# Patient Record
Sex: FEMALE | Race: WHITE | NOT HISPANIC OR LATINO | URBAN - METROPOLITAN AREA
[De-identification: names, ages, dates, MRNs, and addresses within clinical notes are randomized per-mention and may not be internally consistent; named-entity substitution may affect disease eponyms.]

---

## 2022-01-24 ENCOUNTER — WEB ENCOUNTER (OUTPATIENT)
Dept: URBAN - METROPOLITAN AREA CLINIC 23 | Facility: CLINIC | Age: 22
End: 2022-01-24

## 2022-01-24 ENCOUNTER — OFFICE VISIT (OUTPATIENT)
Dept: URBAN - METROPOLITAN AREA CLINIC 23 | Facility: CLINIC | Age: 22
End: 2022-01-24
Payer: COMMERCIAL

## 2022-01-24 DIAGNOSIS — R63.4 WEIGHT LOSS: ICD-10-CM

## 2022-01-24 DIAGNOSIS — R11.2 NAUSEA AND VOMITING, UNSPECIFIED VOMITING TYPE: ICD-10-CM

## 2022-01-24 DIAGNOSIS — E44.1 PROTEIN-CALORIE MALNUTRITION, MILD: ICD-10-CM

## 2022-01-24 DIAGNOSIS — R10.13 EPIGASTRIC PAIN: ICD-10-CM

## 2022-01-24 PROBLEM — 238107002: Status: ACTIVE | Noted: 2022-01-24

## 2022-01-24 PROCEDURE — 99204 OFFICE O/P NEW MOD 45 MIN: CPT | Performed by: INTERNAL MEDICINE

## 2022-01-24 PROCEDURE — G8420 CALC BMI NORM PARAMETERS: HCPCS | Performed by: INTERNAL MEDICINE

## 2022-01-24 PROCEDURE — 3017F COLORECTAL CA SCREEN DOC REV: CPT | Performed by: INTERNAL MEDICINE

## 2022-01-24 PROCEDURE — G9622 NO UNHEAL ETOH USER: HCPCS | Performed by: INTERNAL MEDICINE

## 2022-01-24 PROCEDURE — G8427 DOCREV CUR MEDS BY ELIG CLIN: HCPCS | Performed by: INTERNAL MEDICINE

## 2022-01-24 PROCEDURE — G9903 PT SCRN TBCO ID AS NON USER: HCPCS | Performed by: INTERNAL MEDICINE

## 2022-01-24 PROCEDURE — 1036F TOBACCO NON-USER: CPT | Performed by: INTERNAL MEDICINE

## 2022-01-24 RX ORDER — PANTOPRAZOLE SODIUM 40 MG/1
1 TABLET TABLET, DELAYED RELEASE ORAL
Qty: 90 TABLET | Refills: 0 | OUTPATIENT
Start: 2022-01-24

## 2022-01-24 NOTE — PHYSICAL EXAM GASTROINTESTINAL
Abdomen- soft, mild-tender in the epigastric area, nondistended , no guarding or rigidity , no masses palpable , normal bowel sounds

## 2022-01-24 NOTE — HPI-TODAY'S VISIT:
22-year-old  female presents for 1 year history of nausea, vomiting, abdominal pain.  Abdominal pain mainly in upper, can be anywhere.  Noisy bowel sounds.  30 pounds weight loss.  Intermittent black-colored stools.  Denies NSAIDs use.  She smokes marijuana every day.  No family history of IBD, colon cancer, stomach cancer. She saw primary care doctors she does not know who that was but had blood test and stool samples done.  She was told to see GI doctor as soon as possible. Her bowel movement once daily or every other day, [place] Stool Scale 5-6. vomiting every day for 1 month then now once every other week.

## 2022-02-04 ENCOUNTER — TELEPHONE ENCOUNTER (OUTPATIENT)
Dept: URBAN - METROPOLITAN AREA CLINIC 77 | Facility: CLINIC | Age: 22
End: 2022-02-04

## 2022-02-04 ENCOUNTER — LAB OUTSIDE AN ENCOUNTER (OUTPATIENT)
Dept: URBAN - METROPOLITAN AREA CLINIC 77 | Facility: CLINIC | Age: 22
End: 2022-02-04

## 2022-02-04 LAB
A/G RATIO: 2.5
ALBUMIN: 5
ALKALINE PHOSPHATASE: 62
ALT (SGPT): 7
AST (SGOT): 11
BILIRUBIN, TOTAL: 0.3
BUN/CREATININE RATIO: 15
BUN: 11
C-REACTIVE PROTEIN, QUANT: <1
CALCIUM: 9.9
CARBON DIOXIDE, TOTAL: 23
CHLORIDE: 102
CREATININE: 0.74
EGFR IF AFRICN AM: 135
EGFR IF NONAFRICN AM: 117
GLOBULIN, TOTAL: 2
GLUCOSE: 91
H PYLORI BREATH TEST: NEGATIVE
HEMATOCRIT: 39.9
HEMOGLOBIN: 13.3
IMMUNOGLOBULIN A, QN, SERUM: 225
LIPASE: 20
MCH: 30.9
MCHC: 33.3
MCV: 93
NRBC: (no result)
PLATELETS: 236
POTASSIUM: 4.4
PROTEIN, TOTAL: 7
RBC: 4.3
RDW: 11.9
SEDIMENTATION RATE-WESTERGREN: 2
SODIUM: 139
T-TRANSGLUTAMINASE (TTG) IGA: <2
WBC: 6.6

## 2022-02-10 ENCOUNTER — TELEPHONE ENCOUNTER (OUTPATIENT)
Dept: URBAN - METROPOLITAN AREA CLINIC 77 | Facility: CLINIC | Age: 22
End: 2022-02-10

## 2022-02-22 ENCOUNTER — CLAIMS CREATED FROM THE CLAIM WINDOW (OUTPATIENT)
Dept: URBAN - METROPOLITAN AREA SURGERY CENTER 8 | Facility: SURGERY CENTER | Age: 22
End: 2022-02-22
Payer: COMMERCIAL

## 2022-02-22 ENCOUNTER — OFFICE VISIT (OUTPATIENT)
Dept: URBAN - METROPOLITAN AREA SURGERY CENTER 8 | Facility: SURGERY CENTER | Age: 22
End: 2022-02-22

## 2022-02-22 ENCOUNTER — LAB OUTSIDE AN ENCOUNTER (OUTPATIENT)
Dept: URBAN - METROPOLITAN AREA CLINIC 78 | Facility: CLINIC | Age: 22
End: 2022-02-22

## 2022-02-22 ENCOUNTER — TELEPHONE ENCOUNTER (OUTPATIENT)
Dept: URBAN - METROPOLITAN AREA CLINIC 78 | Facility: CLINIC | Age: 22
End: 2022-02-22

## 2022-02-22 DIAGNOSIS — K31.A15 GASTRIC INTESTINAL METAPLASIA WITHOUT DYSPLASIA, INVOLVING MULTIPLE SITES: ICD-10-CM

## 2022-02-22 DIAGNOSIS — K22.89 ESOPHAGEAL BLEEDING: ICD-10-CM

## 2022-02-22 DIAGNOSIS — R10.13 ABDOMINAL DISCOMFORT, EPIGASTRIC: ICD-10-CM

## 2022-02-22 DIAGNOSIS — K29.60 ADENOPAPILLOMATOSIS GASTRICA: ICD-10-CM

## 2022-02-22 PROCEDURE — G8907 PT DOC NO EVENTS ON DISCHARG: HCPCS | Performed by: INTERNAL MEDICINE

## 2022-02-22 PROCEDURE — 43239 EGD BIOPSY SINGLE/MULTIPLE: CPT | Performed by: INTERNAL MEDICINE

## 2022-02-22 RX ORDER — PANTOPRAZOLE SODIUM 40 MG/1
1 TABLET TABLET, DELAYED RELEASE ORAL
Qty: 90 TABLET | Refills: 0 | Status: ACTIVE | COMMUNITY
Start: 2022-01-24

## 2022-02-22 RX ORDER — PROMETHAZINE HYDROCHLORIDE 25 MG/1
1 TABLET AS NEEDED TABLET ORAL
Qty: 60 TABLET | OUTPATIENT
Start: 2022-02-22 | End: 2022-03-24

## 2022-02-23 ENCOUNTER — TELEPHONE ENCOUNTER (OUTPATIENT)
Dept: URBAN - METROPOLITAN AREA CLINIC 23 | Facility: CLINIC | Age: 22
End: 2022-02-23

## 2022-02-25 ENCOUNTER — TELEPHONE ENCOUNTER (OUTPATIENT)
Dept: URBAN - METROPOLITAN AREA CLINIC 23 | Facility: CLINIC | Age: 22
End: 2022-02-25

## 2022-03-03 ENCOUNTER — LAB OUTSIDE AN ENCOUNTER (OUTPATIENT)
Dept: URBAN - METROPOLITAN AREA CLINIC 23 | Facility: CLINIC | Age: 22
End: 2022-03-03

## 2022-03-16 ENCOUNTER — TELEPHONE ENCOUNTER (OUTPATIENT)
Dept: URBAN - METROPOLITAN AREA CLINIC 78 | Facility: CLINIC | Age: 22
End: 2022-03-16

## 2022-03-16 RX ORDER — MIRTAZAPINE 15 MG/1
1 TABLET AT BEDTIME TABLET, FILM COATED ORAL ONCE A DAY
Qty: 30 | Refills: 2 | OUTPATIENT

## 2022-03-18 ENCOUNTER — LAB OUTSIDE AN ENCOUNTER (OUTPATIENT)
Dept: URBAN - METROPOLITAN AREA CLINIC 78 | Facility: CLINIC | Age: 22
End: 2022-03-18

## 2022-03-22 ENCOUNTER — OFFICE VISIT (OUTPATIENT)
Dept: URBAN - METROPOLITAN AREA SURGERY CENTER 8 | Facility: SURGERY CENTER | Age: 22
End: 2022-03-22

## 2022-03-22 ENCOUNTER — TELEPHONE ENCOUNTER (OUTPATIENT)
Dept: URBAN - METROPOLITAN AREA CLINIC 77 | Facility: CLINIC | Age: 22
End: 2022-03-22

## 2022-04-28 ENCOUNTER — TELEPHONE ENCOUNTER (OUTPATIENT)
Dept: URBAN - METROPOLITAN AREA CLINIC 77 | Facility: CLINIC | Age: 22
End: 2022-04-28

## 2022-05-05 ENCOUNTER — ERX REFILL RESPONSE (OUTPATIENT)
Dept: URBAN - METROPOLITAN AREA CLINIC 78 | Facility: CLINIC | Age: 22
End: 2022-05-05

## 2022-05-05 RX ORDER — MIRTAZAPINE 15 MG/1
TAKE 1 TABLET BY MOUTH EVERY DAY AT BEDTIME FOR 30 DAYS TABLET ORAL
Qty: 90 TABLET | Refills: 1 | OUTPATIENT

## 2022-05-05 RX ORDER — MIRTAZAPINE 15 MG/1
1 TABLET AT BEDTIME TABLET, FILM COATED ORAL ONCE A DAY
Qty: 30 | Refills: 2 | OUTPATIENT

## 2022-05-11 ENCOUNTER — LAB OUTSIDE AN ENCOUNTER (OUTPATIENT)
Dept: URBAN - METROPOLITAN AREA CLINIC 78 | Facility: CLINIC | Age: 22
End: 2022-05-11

## 2022-05-11 ENCOUNTER — DASHBOARD ENCOUNTERS (OUTPATIENT)
Age: 22
End: 2022-05-11

## 2022-05-11 ENCOUNTER — OFFICE VISIT (OUTPATIENT)
Dept: URBAN - METROPOLITAN AREA CLINIC 78 | Facility: CLINIC | Age: 22
End: 2022-05-11
Payer: COMMERCIAL

## 2022-05-11 VITALS
SYSTOLIC BLOOD PRESSURE: 100 MMHG | HEART RATE: 90 BPM | BODY MASS INDEX: 15.99 KG/M2 | DIASTOLIC BLOOD PRESSURE: 72 MMHG | HEIGHT: 65 IN | TEMPERATURE: 97.9 F | WEIGHT: 96 LBS

## 2022-05-11 DIAGNOSIS — R19.7 DIARRHEA OF PRESUMED INFECTIOUS ORIGIN: ICD-10-CM

## 2022-05-11 DIAGNOSIS — R11.2 NAUSEA AND VOMITING, UNSPECIFIED VOMITING TYPE: ICD-10-CM

## 2022-05-11 DIAGNOSIS — R63.4 WEIGHT LOSS: ICD-10-CM

## 2022-05-11 DIAGNOSIS — R10.13 EPIGASTRIC PAIN: ICD-10-CM

## 2022-05-11 PROCEDURE — 99214 OFFICE O/P EST MOD 30 MIN: CPT | Performed by: INTERNAL MEDICINE

## 2022-05-11 RX ORDER — PROMETHAZINE HYDROCHLORIDE 25 MG/1
1 TABLET AS NEEDED TABLET ORAL
Refills: 0 | Status: ACTIVE | COMMUNITY

## 2022-05-11 RX ORDER — SODIUM PICOSULFATE, MAGNESIUM OXIDE, AND ANHYDROUS CITRIC ACID 10; 3.5; 12 MG/160ML; G/160ML; G/160ML
160ML LIQUID ORAL AS DIRECTED
Qty: 320 ML | Refills: 0 | OUTPATIENT
Start: 2022-05-11 | End: 2022-05-12

## 2022-05-11 RX ORDER — ARIPIPRAZOLE 10 MG/1
1 TABLET ON THE TONGUE AND ALLOW TO DISSOLVE TABLET ORAL ONCE A DAY
Status: ACTIVE | COMMUNITY

## 2022-05-11 RX ORDER — LAMOTRIGINE 100 MG/1
1 TABLET TABLET ORAL ONCE A DAY
Status: ACTIVE | COMMUNITY

## 2022-05-11 RX ORDER — DEXTROAMPHETAMINE SULFATE, DEXTROAMPHETAMINE SACCHARATE, AMPHETAMINE SULFATE AND AMPHETAMINE ASPARTATE 3.75; 3.75; 3.75; 3.75 MG/1; MG/1; MG/1; MG/1
1 CAPSULE IN THE MORNING CAPSULE, EXTENDED RELEASE ORAL ONCE A DAY
Refills: 0 | Status: ACTIVE | COMMUNITY

## 2022-05-11 NOTE — HPI-TODAY'S VISIT:
22-year-old female presents epigastric pain, nausea vomiting, loose stools. Vomiting has resolved but still has nausea and epigastric pain.  Watery stool a lot every day.  Weight loss of 4 pounds noted compared to last office visit.  So far work-up EGD, CT enterography, labs, ultrasound unremarkable.  CT enterography showed possible wall thickening in the distal colon.  She was not able to finish gastric emptying study due to vomiting.  She did not try Remeron because of history of side effect hallucination when she was a kid.  Currently she is closely following her psychiatrist for bipolar and anxiety.  Some changes in her medications going on.

## 2022-05-11 NOTE — PREVIOUS WORKUP REVIEWED
.ENDOSCOPIES-EGD 2/22/2022: Normal. Repeat EGD in 3 years.*Pathology: Gastric-mild chronic gastritis, negative for H. pylori. Positive intestinal metaplasia complete and incomplete. Negative for atrophy, dysplasia or malignancy. Duodenum-normal. Distal esophagus-mild reflux esophagitis. Proximal esophagus-normal.LABS-Labs 2/2/2022: ESR 2, lipase 20, CRP < 1, H. pylori breath test negative, glucose 91, BUN 11, creatinine 0.74, sodium 139, potassium 4.4, total protein 7.0, albumin 5.0, total bilirubin 0.3, alkaline phosphatase 62, AST 11, ALT 7. IgA TTG < 2, IgA 225. WBC 6.6, hemoglobin 13.3, platelet 236.IMAGES-CT enterography 3/11/2022: No evidence of active inflammation in the small bowel. Apparent mild wall thickening of the sigmoid colon and rectum. Possibly secondary to under distention. Small volume nonspecific pelvic fluid.-RUQ US 2/28/2022: Normal liver and gallbladder, common bile duct, pancreas. No gallstone.

## 2022-05-20 ENCOUNTER — CLAIMS CREATED FROM THE CLAIM WINDOW (OUTPATIENT)
Dept: URBAN - METROPOLITAN AREA SURGERY CENTER 15 | Facility: SURGERY CENTER | Age: 22
End: 2022-05-20

## 2022-05-20 ENCOUNTER — CLAIMS CREATED FROM THE CLAIM WINDOW (OUTPATIENT)
Dept: URBAN - METROPOLITAN AREA SURGERY CENTER 15 | Facility: SURGERY CENTER | Age: 22
End: 2022-05-20
Payer: COMMERCIAL

## 2022-05-20 ENCOUNTER — LAB OUTSIDE AN ENCOUNTER (OUTPATIENT)
Dept: URBAN - METROPOLITAN AREA CLINIC 78 | Facility: CLINIC | Age: 22
End: 2022-05-20

## 2022-05-20 ENCOUNTER — TELEPHONE ENCOUNTER (OUTPATIENT)
Dept: URBAN - METROPOLITAN AREA CLINIC 78 | Facility: CLINIC | Age: 22
End: 2022-05-20

## 2022-05-20 DIAGNOSIS — Z53.8 FAILED ATTEMPTED SURGICAL PROCEDURE: ICD-10-CM

## 2022-05-20 DIAGNOSIS — R93.3 ABN FINDINGS-GI TRACT: ICD-10-CM

## 2022-05-20 DIAGNOSIS — R19.7 ACUTE DIARRHEA: ICD-10-CM

## 2022-05-20 PROCEDURE — 45378 DIAGNOSTIC COLONOSCOPY: CPT | Performed by: INTERNAL MEDICINE

## 2022-05-20 PROCEDURE — G8907 PT DOC NO EVENTS ON DISCHARG: HCPCS | Performed by: INTERNAL MEDICINE

## 2022-05-20 RX ORDER — ARIPIPRAZOLE 10 MG/1
1 TABLET ON THE TONGUE AND ALLOW TO DISSOLVE TABLET ORAL ONCE A DAY
Status: ACTIVE | COMMUNITY

## 2022-05-20 RX ORDER — PROMETHAZINE HYDROCHLORIDE 25 MG/1
1 TABLET AS NEEDED TABLET ORAL
Refills: 0 | Status: ACTIVE | COMMUNITY

## 2022-05-20 RX ORDER — SODIUM SULFATE, MAGNESIUM SULFATE, AND POTASSIUM CHLORIDE 17.75; 2.7; 2.25 G/1; G/1; G/1
12 TABLETS TABLET ORAL
Qty: 24 TABLETS | Refills: 0 | OUTPATIENT
Start: 2022-05-23 | End: 2022-05-24

## 2022-05-20 RX ORDER — SODIUM, POTASSIUM,MAG SULFATES 17.5-3.13G
17.5-13.3-1.6 GM/177ML SOLUTION, RECONSTITUTED, ORAL ORAL AS DIRECTED
Qty: 354 MILLILITER | OUTPATIENT
Start: 2022-05-20 | End: 2022-05-21

## 2022-05-20 RX ORDER — DEXTROAMPHETAMINE SULFATE, DEXTROAMPHETAMINE SACCHARATE, AMPHETAMINE SULFATE AND AMPHETAMINE ASPARTATE 3.75; 3.75; 3.75; 3.75 MG/1; MG/1; MG/1; MG/1
1 CAPSULE IN THE MORNING CAPSULE, EXTENDED RELEASE ORAL ONCE A DAY
Refills: 0 | Status: ACTIVE | COMMUNITY

## 2022-05-20 RX ORDER — LAMOTRIGINE 100 MG/1
1 TABLET TABLET ORAL ONCE A DAY
Status: ACTIVE | COMMUNITY

## 2022-05-31 ENCOUNTER — TELEPHONE ENCOUNTER (OUTPATIENT)
Dept: URBAN - METROPOLITAN AREA CLINIC 78 | Facility: CLINIC | Age: 22
End: 2022-05-31

## 2022-05-31 RX ORDER — SODIUM SULFATE, MAGNESIUM SULFATE, AND POTASSIUM CHLORIDE 17.75; 2.7; 2.25 G/1; G/1; G/1
12 TABLETS TABLET ORAL
Qty: 24 TABLETS | Refills: 0 | OUTPATIENT
Start: 2022-06-01 | End: 2022-06-02

## 2022-06-02 ENCOUNTER — OFFICE VISIT (OUTPATIENT)
Dept: URBAN - METROPOLITAN AREA SURGERY CENTER 15 | Facility: SURGERY CENTER | Age: 22
End: 2022-06-02
Payer: COMMERCIAL

## 2022-06-02 DIAGNOSIS — R93.3 ABN FINDINGS-GI TRACT: ICD-10-CM

## 2022-06-02 PROCEDURE — 45378 DIAGNOSTIC COLONOSCOPY: CPT | Performed by: INTERNAL MEDICINE

## 2022-06-02 PROCEDURE — G8907 PT DOC NO EVENTS ON DISCHARG: HCPCS | Performed by: INTERNAL MEDICINE

## 2022-06-02 RX ORDER — LAMOTRIGINE 100 MG/1
1 TABLET TABLET ORAL ONCE A DAY
Status: ACTIVE | COMMUNITY

## 2022-06-02 RX ORDER — DEXTROAMPHETAMINE SULFATE, DEXTROAMPHETAMINE SACCHARATE, AMPHETAMINE SULFATE AND AMPHETAMINE ASPARTATE 3.75; 3.75; 3.75; 3.75 MG/1; MG/1; MG/1; MG/1
1 CAPSULE IN THE MORNING CAPSULE, EXTENDED RELEASE ORAL ONCE A DAY
Refills: 0 | Status: ACTIVE | COMMUNITY

## 2022-06-02 RX ORDER — ARIPIPRAZOLE 10 MG/1
1 TABLET ON THE TONGUE AND ALLOW TO DISSOLVE TABLET ORAL ONCE A DAY
Status: ACTIVE | COMMUNITY

## 2022-06-02 RX ORDER — SODIUM SULFATE, MAGNESIUM SULFATE, AND POTASSIUM CHLORIDE 17.75; 2.7; 2.25 G/1; G/1; G/1
12 TABLETS TABLET ORAL
Qty: 24 TABLETS | Refills: 0 | Status: ACTIVE | COMMUNITY
Start: 2022-06-01 | End: 2022-06-02

## 2022-06-02 RX ORDER — PROMETHAZINE HYDROCHLORIDE 25 MG/1
1 TABLET AS NEEDED TABLET ORAL
Refills: 0 | Status: ACTIVE | COMMUNITY

## 2022-08-25 ENCOUNTER — ERX REFILL RESPONSE (OUTPATIENT)
Dept: URBAN - METROPOLITAN AREA CLINIC 78 | Facility: CLINIC | Age: 22
End: 2022-08-25

## 2022-08-25 RX ORDER — MIRTAZAPINE 15 MG/1
1 TABLET AT BEDTIME TABLET, FILM COATED ORAL ONCE A DAY
Qty: 90 TABLET | Refills: 3 | OUTPATIENT

## 2022-08-25 RX ORDER — MIRTAZAPINE 15 MG/1
TAKE 1 TABLET BY MOUTH EVERY DAY AT BEDTIME FOR 30 DAYS TABLET ORAL
Qty: 30 TABLET | Refills: 3 | OUTPATIENT